# Patient Record
Sex: FEMALE | Race: WHITE | NOT HISPANIC OR LATINO | URBAN - METROPOLITAN AREA
[De-identification: names, ages, dates, MRNs, and addresses within clinical notes are randomized per-mention and may not be internally consistent; named-entity substitution may affect disease eponyms.]

---

## 2021-07-27 PROBLEM — K57.92 DIVERTICULITIS: Status: ACTIVE | Noted: 2021-07-27

## 2021-09-02 ENCOUNTER — OFFICE (OUTPATIENT)
Dept: URBAN - METROPOLITAN AREA CLINIC 79 | Facility: CLINIC | Age: 66
End: 2021-09-02

## 2021-09-02 VITALS
TEMPERATURE: 96.8 F | HEART RATE: 59 BPM | SYSTOLIC BLOOD PRESSURE: 151 MMHG | HEIGHT: 68 IN | WEIGHT: 239 LBS | DIASTOLIC BLOOD PRESSURE: 92 MMHG

## 2021-09-02 DIAGNOSIS — Z99.89 DEPENDENCE ON OTHER ENABLING MACHINES AND DEVICES: ICD-10-CM

## 2021-09-02 DIAGNOSIS — R19.7 DIARRHEA, UNSPECIFIED: ICD-10-CM

## 2021-09-02 DIAGNOSIS — E03.9 HYPOTHYROIDISM, UNSPECIFIED: ICD-10-CM

## 2021-09-02 LAB
CELIAC DISEASE COMPREHENSIVE: DEAMIDATED GLIADIN ABS, IGA: 4 UNITS (ref 0–19)
CELIAC DISEASE COMPREHENSIVE: DEAMIDATED GLIADIN ABS, IGG: 7 UNITS (ref 0–19)
CELIAC DISEASE COMPREHENSIVE: ENDOMYSIAL ANTIBODY IGA: NEGATIVE
CELIAC DISEASE COMPREHENSIVE: IMMUNOGLOBULIN A, QN, SERUM: 227 MG/DL (ref 87–352)
CELIAC DISEASE COMPREHENSIVE: T-TRANSGLUTAMINASE (TTG) IGA: <2 U/ML
CELIAC DISEASE COMPREHENSIVE: T-TRANSGLUTAMINASE (TTG) IGG: 3 U/ML (ref 0–5)

## 2021-09-02 PROCEDURE — 99204 OFFICE O/P NEW MOD 45 MIN: CPT | Performed by: PHYSICIAN ASSISTANT

## 2021-09-02 PROCEDURE — 00031: CPT | Performed by: INTERNAL MEDICINE

## 2021-09-02 NOTE — SERVICEHPINOTES
EUGENIA ROACH   is a   65   year old white   yo white female who is being seen in consultation at the request of   SERENA WILLETT   for chronic diarrhea: No better or worse over time. Last colonoscopy about 5 yrs ago or longer (unable to recall exact date or provider/facility name). She mentions h/o diverticular disease: No acute diverticulitis events. She informs of long h/o diarrhea that is intermittent in nature: No blood in stool or BRBPR. She notes diarrhea has "no pattern": No identifiable triggers. Prior stool testing many years ago but unsure what was checked in that study. No fm h/o CRC or colonic polyps. Denies chest pain, n/v, abdominal pain, decrease in appetite, melena, BRBPR, weight loss. BR